# Patient Record
Sex: MALE | Race: OTHER | NOT HISPANIC OR LATINO | URBAN - METROPOLITAN AREA
[De-identification: names, ages, dates, MRNs, and addresses within clinical notes are randomized per-mention and may not be internally consistent; named-entity substitution may affect disease eponyms.]

---

## 2023-08-13 ENCOUNTER — OFFICE VISIT (OUTPATIENT)
Dept: URGENT CARE | Facility: PHYSICIAN GROUP | Age: 68
End: 2023-08-13

## 2024-04-09 NOTE — PROGRESS NOTES
This encounter was created in error - please disregard.  This encounter was created in error - please disregard.

## 2024-08-20 ENCOUNTER — OFFICE VISIT (OUTPATIENT)
Dept: URGENT CARE | Facility: PHYSICIAN GROUP | Age: 69
End: 2024-08-20

## 2024-08-20 VITALS
WEIGHT: 123.4 LBS | DIASTOLIC BLOOD PRESSURE: 56 MMHG | HEART RATE: 81 BPM | BODY MASS INDEX: 17.67 KG/M2 | SYSTOLIC BLOOD PRESSURE: 88 MMHG | HEIGHT: 70 IN | TEMPERATURE: 98.7 F | RESPIRATION RATE: 16 BRPM | OXYGEN SATURATION: 98 %

## 2024-08-20 DIAGNOSIS — R10.9 ABDOMINAL PAIN, UNSPECIFIED ABDOMINAL LOCATION: ICD-10-CM

## 2024-08-20 DIAGNOSIS — I95.9 HYPOTENSION, UNSPECIFIED HYPOTENSION TYPE: ICD-10-CM

## 2024-08-20 DIAGNOSIS — K59.00 CONSTIPATION, UNSPECIFIED CONSTIPATION TYPE: ICD-10-CM

## 2024-08-20 DIAGNOSIS — R30.0 DYSURIA: ICD-10-CM

## 2024-08-20 DIAGNOSIS — N39.8 VOIDING DYSFUNCTION: ICD-10-CM

## 2024-08-20 LAB
APPEARANCE UR: CLEAR
BILIRUB UR STRIP-MCNC: NORMAL MG/DL
COLOR UR AUTO: YELLOW
GLUCOSE UR STRIP.AUTO-MCNC: NORMAL MG/DL
KETONES UR STRIP.AUTO-MCNC: NORMAL MG/DL
LEUKOCYTE ESTERASE UR QL STRIP.AUTO: NORMAL
NITRITE UR QL STRIP.AUTO: NORMAL
PH UR STRIP.AUTO: 7 [PH] (ref 5–8)
PROT UR QL STRIP: 100 MG/DL
RBC UR QL AUTO: NORMAL
SP GR UR STRIP.AUTO: 1.02
UROBILINOGEN UR STRIP-MCNC: 0.2 MG/DL

## 2024-08-20 PROCEDURE — 3074F SYST BP LT 130 MM HG: CPT | Performed by: NURSE PRACTITIONER

## 2024-08-20 PROCEDURE — 3078F DIAST BP <80 MM HG: CPT | Performed by: NURSE PRACTITIONER

## 2024-08-20 PROCEDURE — 99203 OFFICE O/P NEW LOW 30 MIN: CPT | Performed by: NURSE PRACTITIONER

## 2024-08-20 PROCEDURE — 81002 URINALYSIS NONAUTO W/O SCOPE: CPT | Performed by: NURSE PRACTITIONER

## 2024-08-20 ASSESSMENT — ENCOUNTER SYMPTOMS
CONSTIPATION: 1
ABDOMINAL PAIN: 1

## 2024-08-20 NOTE — PROGRESS NOTES
Subjective:     Itzel Rashid is a 69 y.o. male who presents for Abdominal Pain, Constipation, and Painful Urination (Pts daughter states pt has been dealing with abdominal pain, painful urination and constipation for years. Pt usually resides in [place], he is here visiting his daughter. The medication he's been on doesn't work and he has trouble sleeping. Pt daughter advises he does not eat well or often. He does not urinate regularly either. She knows he takes medication but doesn't know the names and they are not working for him. 8/10 abdominal pain currently)      Abdominal Pain  Associated symptoms include constipation.   Constipation  Associated symptoms include abdominal pain.     Pt presents for evaluation of a new problem.  Patient is a 69-year-old male who presents to urgent care today along with his wife and daughter who are acting as his 's.  He is visiting from [place] and has been experiencing abdominal pain for the past 1 month.  Daughter states that he has not had a bowel movement in the past 3 days and his eating very little.  Patient notes that pain is present constantly and has progressively worsened.  His daughter believes that once he has a bowel movement his pain improved however, patient disputes this.  He has not had any fevers, vomiting or diarrhea.  Unknown surgical history.  Patient's daughter notes that he does take a lot of medication, but is unsure of what medication he does take.  Patient's daughter states that he is also having a difficult time urinating and has to push on his bladder in order to void.    Review of Systems   Gastrointestinal:  Positive for abdominal pain and constipation.       PMH: No past medical history on file.  ALLERGIES: Not on File  SURGHX: No past surgical history on file.  SOCHX:   Social History     Socioeconomic History    Marital status:      FH: No family history on file.      Objective:   BP (!) 88/56 (BP Location: Left arm, Patient  "Position: Sitting, BP Cuff Size: Adult)   Pulse 81   Temp 37.1 °C (98.7 °F) (Temporal)   Resp 16   Ht 1.778 m (5' 10\")   Wt 56 kg (123 lb 6.4 oz)   SpO2 98%   BMI 17.71 kg/m²     Physical Exam  Vitals and nursing note reviewed.   Constitutional:       General: He is not in acute distress.     Appearance: Normal appearance. He is normal weight. He is ill-appearing. He is not toxic-appearing.   HENT:      Head: Normocephalic.      Right Ear: External ear normal.      Left Ear: External ear normal.      Nose: Nose normal.      Mouth/Throat:      Mouth: Mucous membranes are moist.   Eyes:      General:         Right eye: No discharge.         Left eye: No discharge.      Extraocular Movements: Extraocular movements intact.      Conjunctiva/sclera: Conjunctivae normal.      Pupils: Pupils are equal, round, and reactive to light.   Pulmonary:      Effort: Pulmonary effort is normal.      Breath sounds: Normal breath sounds.   Abdominal:      General: Abdomen is flat.      Tenderness: There is abdominal tenderness in the right upper quadrant, right lower quadrant, left upper quadrant and left lower quadrant. There is guarding and rebound.   Musculoskeletal:         General: Normal range of motion.      Cervical back: Normal range of motion and neck supple. No rigidity.   Lymphadenopathy:      Cervical: No cervical adenopathy.   Skin:     General: Skin is warm and dry.   Neurological:      General: No focal deficit present.      Mental Status: He is alert and oriented to person, place, and time. Mental status is at baseline.   Psychiatric:         Mood and Affect: Mood normal.         Behavior: Behavior normal.         Judgment: Judgment normal.         Assessment/Plan:   Assessment      1. Abdominal pain, unspecified abdominal location  POCT Urinalysis      2. Dysuria  POCT Urinalysis      3. Voiding dysfunction  POCT Urinalysis      4. Constipation, unspecified constipation type        5. Hypotension, unspecified " hypotension type          Patient is very ill-appearing in clinic and at this time needs a further workup in emergency room for higher level of care.  I did explain to patient and family my reasoning for sending him to the emergency room including hypotension, severe abdominal pain, urinary dysfunction and unknown medical history.  They are in agreement with plan of care and plan to seek treatment at [facility].